# Patient Record
Sex: MALE
[De-identification: names, ages, dates, MRNs, and addresses within clinical notes are randomized per-mention and may not be internally consistent; named-entity substitution may affect disease eponyms.]

---

## 2023-08-10 PROBLEM — Z00.00 ENCOUNTER FOR PREVENTIVE HEALTH EXAMINATION: Status: ACTIVE | Noted: 2023-08-10

## 2023-08-23 ENCOUNTER — APPOINTMENT (OUTPATIENT)
Dept: UROLOGY | Facility: CLINIC | Age: 38
End: 2023-08-23

## 2023-08-25 ENCOUNTER — APPOINTMENT (OUTPATIENT)
Dept: UROLOGY | Facility: CLINIC | Age: 38
End: 2023-08-25
Payer: OTHER GOVERNMENT

## 2023-08-25 VITALS
HEART RATE: 56 BPM | BODY MASS INDEX: 24.76 KG/M2 | DIASTOLIC BLOOD PRESSURE: 79 MMHG | TEMPERATURE: 98.3 F | RESPIRATION RATE: 16 BRPM | SYSTOLIC BLOOD PRESSURE: 121 MMHG | OXYGEN SATURATION: 99 % | WEIGHT: 214 LBS | HEIGHT: 78 IN

## 2023-08-25 PROCEDURE — 99204 OFFICE O/P NEW MOD 45 MIN: CPT

## 2023-08-26 NOTE — HISTORY OF PRESENT ILLNESS
[FreeTextEntry1] : Language: English Date of First visit: 08/25/2023 Accompanied by: self Contact info: Referring Provider/PCP: Dr. Frantz Cevallos Fax: [phone]   CC/ Problem List:  LUTS =============================================================================== FIRST VISIT / Summary: Very pleasant 37 year old M here for LUTS / urgency going about 5-6 times daily. Sometimes has urge incontinence, then holds some back in but more than a few drops. No uti's. Drinks at least 3-4 cups coffee per day plus at least a gallon of water. Did have cold sore and had treatment for it with valtrex. He also may be holding urine as a  does not have access to restroom easily.   ------------------------------------------------------------------------------------------- INTERVAL VISITS:   ===============================================================================   PMH: Meds: All: FHx: SocHx:   PSH:     ROS: Review of Systems is as per HPI unless otherwise denoted below     =============================================================================== DATA:   LABS (SELECTED):---------------------------------------------------------------------------------------------------  8/7/23: Cr 0.87, cx negative, UA negative   RADS:-------------------------------------------------------------------------------------------------------------------     PATHOLOGY/CYTOLOGY:-------------------------------------------------------------------------------------------     VOIDING STUDIES: ----------------------------------------------------------------------------------------------------  08/25/2023 PVR initially 387 after only 10 minutes waiting. then voided and 20   STONE STUDIES: (Analysis/LLSA)----------------------------------------------------------------------------------     PROCEDURES: -----------------------------------------------------------------------------------------------       ===============================================================================   PHYSICAL EXAM:     FOCUSED: ----------------------------------------------------------------------------------------------------------------  declined   ======================================================================================= DISCUSSION: ======================================================================================= ASSESSMENT and PLAN     1. LUTS - decrease fluids to 3L daily - avoid holding - ok to decrease fluids 1-2hrs before flight, etc to reduce need to hold - has minimal sensation with large bladder volume, suggesting some stretching already - given good stream no concern for obstruction   =======================================================================================   Thank you for allowing me to assist in the care of your patient. Should you have any questions please do not hesitate to reach out to me.     Andres Ortega MD                                                           [facility]   [place] Office: [address], [address]  [place] [address]   T: [phone]   F: [phone]     [place] Office: [address], [address]st [place] [address] T: [phone] F: [phone]